# Patient Record
Sex: MALE | Race: WHITE | NOT HISPANIC OR LATINO | Employment: STUDENT | URBAN - METROPOLITAN AREA
[De-identification: names, ages, dates, MRNs, and addresses within clinical notes are randomized per-mention and may not be internally consistent; named-entity substitution may affect disease eponyms.]

---

## 2024-05-05 NOTE — PROGRESS NOTES
NEW PATIENT    HISTORY OF PRESENT ILLNESS   18 y.o. 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 He has been having right shoulder pain for about 3 weeks. He noticed it after pitching a game. He states that this past weekend he had to pull himself out of the game due to shoulder pain. He gets a shooting pain down the back of his arm. He feels a knot in the front of his shoulder. His pain is mostly at the anterior aspect of the shoulder. His shoulder pain is also aggravated by reaching and pulling. He only gets relief with rest and immobilization. He is unable to throw more than 60 feet without the shoulder hurting. He states that his  wanted him to get a steroid injection in the shoulder but he would like to discuss his options because he will be playing for [employer] next year.       Is affecting ADLs.  Pain is 4/10 at it's worst.        PAST MEDICAL HISTORY    No past medical history on file.    PAST SURGICAL HISTORY     Past Surgical History:   Procedure Laterality Date    FOOT SURGERY         FAMILY HISTORY    No family history on file.    SOCIAL HISTORY    Social History     Socioeconomic History    Marital status: Single   Tobacco Use    Smoking status: Never   Substance and Sexual Activity    Alcohol use: Never       MEDICATIONS      Current Outpatient Medications:     naproxen (NAPROSYN) 500 MG tablet, Take 1 tablet (500 mg total) by mouth 2 (two) times daily with meals., Disp: 15 tablet, Rfl: 0    ALLERGIES     Review of patient's allergies indicates:   Allergen Reactions    Peanut     Shrimp     Sulfa (sulfonamide antibiotics)          REVIEW OF SYSTEMS   Constitution: Negative. Negative for chills, fever and night sweats.   HENT: Negative for congestion and headaches.    Eyes: Negative for blurred vision, left vision loss and right vision loss.   Cardiovascular: Negative for chest pain and syncope.   Respiratory: Negative for cough and  shortness of breath.    Endocrine: Negative for polydipsia, polyphagia and polyuria.   Hematologic/Lymphatic: Negative for bleeding problem. Does not bruise/bleed easily.   Skin: Negative for dry skin, itching and rash.   Musculoskeletal: Negative for falls. Positive for right shoulder pain.  Gastrointestinal: Negative for abdominal pain and bowel incontinence.   Genitourinary: Negative for bladder incontinence and nocturia.   Neurological: Negative for disturbances in coordination, loss of balance and seizures.   Psychiatric/Behavioral: Negative for depression. The patient does not have insomnia.    Allergic/Immunologic: Negative for hives and persistent infections.     PHYSICAL EXAMINATION    Vitals: /79   Pulse (!) 57   Wt 89 kg (196 lb 3.4 oz)     General: The patient appears active and healthy with no apparent physical problems.  No disturbance of mood or affect is demonstrated. Alert and Oriented.    HEENT: Eyes normal, pupils equally round, nose normal.    Resp: Equal and symmetrical chest rises. No wheezing    CV: Regular rate    Neck: Supple; nonpainful range of motion.    Extremities: no cyanosis, clubbing, edema, or diffuse swelling.  Palpable pulses, good capillary refill of the digits.  No coolness, discoloration, edema or obvious varicosities.    Skin: no lesions noted.    Lymphatic: no detected adenopathy in the upper or lower extremities.    Neurologic: normal mental status, normal reflexes, normal gait and balance.  Patient is alert and oriented to person, place and time.  No flaccidity or spasticity is noted.  No motor or sensory deficits are noted.  Light touch is intact    Orthopaedic: SHOULDER EXAM - RIGHT    Inspection:   Normal skin color and appearance with no scars.  No muscle atrophy noted.  No scapular winging.      Palpation: No tenderness of the acromioclavicular joint, lateral edge of the acromion, biceps tendon, trapezius muscle or scapulothoracic bursa.      ROM:      PROM:      FE - 160°    Abd/ER -  90°  Abd/IR -  45°   Add/ER -  60°     AROM:    FE - 180°    Abd/ER -  90°  Abd/IR -  45°   Add/ER -  60°         Tests:     - Lopez, - Neer's, - Cross Arm Adduction, + [place], - Yerguson, - Speed. - Belly Press,  - Jobes, - Lift Off    Stability: - sulcus, + apprehension, + relocation, - load and shift, + DLS      Motor:  Rotator cuff strength is 4/5 supraspinatus, 4/5 infraspinatus, 5/5 subscapularis. Biceps, triceps and deltoid strength is 5/5.      Neuro: Distally there are no paresthesias, and sensation is intact to light touch in the median, ulnar, and radial distributions.  Reflexes are 2/2 biceps, triceps and brachioradialis.        IMAGING    I reviewed images of his right shoulder.  No fractures or dislocations noted.  Some mild posterior glenoid dysplasia noted.  No evidence of a Solitario's lesion.    IMPRESSION       ICD-10-CM ICD-9-CM   1. Right shoulder pain, unspecified chronicity  M25.511 719.41   2. Impingement syndrome of right shoulder  M75.41 726.2       MEDICATIONS PRESCRIBED      None    RECOMMENDATIONS     MRI right shoulder ordered today  Begin physical therapy   RTC after MRI      All questions were answered, pt will contact us for questions or concerns in the interim.

## 2024-05-06 ENCOUNTER — HOSPITAL ENCOUNTER (OUTPATIENT)
Dept: RADIOLOGY | Facility: HOSPITAL | Age: 19
Discharge: HOME | End: 2024-05-06
Attending: ORTHOPAEDIC SURGERY
Payer: COMMERCIAL

## 2024-05-06 ENCOUNTER — OFFICE VISIT (OUTPATIENT)
Dept: SPORTS MEDICINE | Facility: CLINIC | Age: 19
End: 2024-05-06
Payer: COMMERCIAL

## 2024-05-06 VITALS — SYSTOLIC BLOOD PRESSURE: 125 MMHG | HEART RATE: 57 BPM | DIASTOLIC BLOOD PRESSURE: 79 MMHG | WEIGHT: 196.19 LBS

## 2024-05-06 DIAGNOSIS — M75.111 NONTRAUMATIC INCOMPLETE TEAR OF RIGHT ROTATOR CUFF: ICD-10-CM

## 2024-05-06 DIAGNOSIS — M75.41 INTERNAL IMPINGEMENT OF RIGHT SHOULDER: Primary | ICD-10-CM

## 2024-05-06 DIAGNOSIS — G89.29 CHRONIC RIGHT SHOULDER PAIN: ICD-10-CM

## 2024-05-06 DIAGNOSIS — M75.41 IMPINGEMENT SYNDROME OF RIGHT SHOULDER: ICD-10-CM

## 2024-05-06 DIAGNOSIS — M25.511 RIGHT SHOULDER PAIN, UNSPECIFIED CHRONICITY: ICD-10-CM

## 2024-05-06 DIAGNOSIS — M25.511 CHRONIC RIGHT SHOULDER PAIN: ICD-10-CM

## 2024-05-06 PROCEDURE — 73030 X-RAY EXAM OF SHOULDER: CPT | Mod: TC,RT

## 2024-05-06 PROCEDURE — 99204 OFFICE O/P NEW MOD 45 MIN: CPT | Mod: S$GLB,,, | Performed by: ORTHOPAEDIC SURGERY

## 2024-05-06 PROCEDURE — 1159F MED LIST DOCD IN RCRD: CPT | Mod: CPTII,S$GLB,, | Performed by: ORTHOPAEDIC SURGERY

## 2024-05-06 PROCEDURE — 73030 X-RAY EXAM OF SHOULDER: CPT | Mod: 26,RT,, | Performed by: RADIOLOGY

## 2024-05-06 PROCEDURE — 99999 PR PBB SHADOW E&M-NEW PATIENT-LVL III: CPT | Mod: PBBFAC,,, | Performed by: ORTHOPAEDIC SURGERY

## 2024-05-06 PROCEDURE — 3078F DIAST BP <80 MM HG: CPT | Mod: CPTII,S$GLB,, | Performed by: ORTHOPAEDIC SURGERY

## 2024-05-06 PROCEDURE — 3074F SYST BP LT 130 MM HG: CPT | Mod: CPTII,S$GLB,, | Performed by: ORTHOPAEDIC SURGERY

## 2024-05-07 ENCOUNTER — HOSPITAL ENCOUNTER (OUTPATIENT)
Dept: RADIOLOGY | Facility: HOSPITAL | Age: 19
Discharge: HOME | End: 2024-05-07
Attending: ORTHOPAEDIC SURGERY
Payer: COMMERCIAL

## 2024-05-07 DIAGNOSIS — M75.41 IMPINGEMENT SYNDROME OF RIGHT SHOULDER: ICD-10-CM

## 2024-05-07 DIAGNOSIS — M25.511 RIGHT SHOULDER PAIN, UNSPECIFIED CHRONICITY: ICD-10-CM

## 2024-05-07 PROCEDURE — 73221 MRI JOINT UPR EXTREM W/O DYE: CPT | Mod: 26,RT,, | Performed by: INTERNAL MEDICINE

## 2024-05-07 PROCEDURE — 73221 MRI JOINT UPR EXTREM W/O DYE: CPT | Mod: TC,RT

## 2024-05-14 NOTE — PROGRESS NOTES
ESTABLISHED PATIENT    History 5/15/2024:  Kaushal returns to clinic today to discuss MRI results of his right shoulder.  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    History 5/6/2024:  18 y.o. 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 He has been having right shoulder pain for about 3 weeks. He noticed it after pitching a game. He states that this past weekend he had to pull himself out of the game due to shoulder pain. He gets a shooting pain down the back of his arm. He feels a knot in the front of his shoulder. His pain is mostly at the anterior aspect of the shoulder. His shoulder pain is also aggravated by reaching and pulling. He only gets relief with rest and immobilization. He is unable to throw more than 60 feet without the shoulder hurting. He states that his  wanted him to get a steroid injection in the shoulder but he would like to discuss his options because he will be playing for [employer] next year.       Is affecting ADLs.  Pain is 4/10 at it's worst.        REVIEW OF SYSTEMS   Constitution: Negative. Negative for chills, fever and night sweats.   HENT: Negative for congestion and headaches.    Eyes: Negative for blurred vision, left vision loss and right vision loss.   Cardiovascular: Negative for chest pain and syncope.   Respiratory: Negative for cough and shortness of breath.    Endocrine: Negative for polydipsia, polyphagia and polyuria.   Hematologic/Lymphatic: Negative for bleeding problem. Does not bruise/bleed easily.   Skin: Negative for dry skin, itching and rash.   Musculoskeletal: Negative for falls. Positive for right shoulder pain.  Gastrointestinal: Negative for abdominal pain and bowel incontinence.   Genitourinary: Negative for bladder incontinence and nocturia.   Neurological: Negative for disturbances in coordination, loss of balance and seizures.   Psychiatric/Behavioral: Negative for depression. The patient  does not have insomnia.    Allergic/Immunologic: Negative for hives and persistent infections.     PHYSICAL EXAMINATION    Vitals: Wt 89 kg (196 lb 3.4 oz)     General: The patient appears active and healthy with no apparent physical problems.  No disturbance of mood or affect is demonstrated. Alert and Oriented.    HEENT: Eyes normal, pupils equally round, nose normal.    Resp: Equal and symmetrical chest rises. No wheezing    CV: Regular rate    Neck: Supple; nonpainful range of motion.    Extremities: no cyanosis, clubbing, edema, or diffuse swelling.  Palpable pulses, good capillary refill of the digits.  No coolness, discoloration, edema or obvious varicosities.    Skin: no lesions noted.    Lymphatic: no detected adenopathy in the upper or lower extremities.    Neurologic: normal mental status, normal reflexes, normal gait and balance.  Patient is alert and oriented to person, place and time.  No flaccidity or spasticity is noted.  No motor or sensory deficits are noted.  Light touch is intact    Orthopaedic: SHOULDER EXAM - RIGHT    Inspection:   Normal skin color and appearance with no scars.  No muscle atrophy noted.  No scapular winging.      Palpation: No tenderness of the acromioclavicular joint, lateral edge of the acromion, biceps tendon, trapezius muscle or scapulothoracic bursa.      ROM:      PROM:     FE - 160°    Abd/ER -  90°  Abd/IR -  45°   Add/ER -  60°     AROM:    FE - 180°    Abd/ER -  90°  Abd/IR -  45°   Add/ER -  60°         Tests:     - Lopez, - Neer's, - Cross Arm Adduction, + Deal, - Yerguson, - Speed. - Belly Press,  - Jobes, - Lift Off    Stability: - sulcus, + apprehension, + relocation, - load and shift, + DLS      Motor:  Rotator cuff strength is 4/5 supraspinatus, 4/5 infraspinatus, 5/5 subscapularis. Biceps, triceps and deltoid strength is 5/5.      Neuro: Distally there are no paresthesias, and sensation is intact to light touch in the median, ulnar, and radial  distributions.  Reflexes are 2/2 biceps, triceps and brachioradialis.        IMAGING    Narrative & Impression  EXAMINATION:  MRI SHOULDER WITHOUT CONTRAST RIGHT     CLINICAL HISTORY:  Shoulder pain, labral tear suspected, xray done;  Pain in right shoulder     TECHNIQUE:  Multiplanar multisequence MRI examination of right shoulder.     COMPARISON:  Right shoulder radiograph 05/06/2024.     FINDINGS:  Sequences are degraded by patient motion artifact.     Coracoacromial arch: The acromioclavicular joint is unremarkable.  Trace subacromial subdeltoid bursal fluid.     Rotator cuff: Low grade partial thickness intrasubstance tear of the infraspinatus posteriorly.  Supraspinatus, subscapularis, and teres minor intact. No retraction or muscle atrophy.     Glenoid labrum: Nondisplaced posterior to inferior labral tear.     Biceps tendon: The biceps tendon long head is intact at the superior glenoid tubercle and is normally positioned in the biceps groove.     Bone: Blunting of the posterior glenoid rim with glenoid retroversion.  No acute fracture, osteonecrosis, or focal lesion.     Joint: No cartilage defect. No joint effusion, synovitis, or capsulitis.     Impression:     Motion degraded examination.     Low-grade intrasubstance tear of infraspinatus.     Nondisplaced labral tear extending from posterosuperior to inferior.     Blunting of the posterior glenoid rim with retroversion, which could be physiologic or traumatic in a throwing athlete.     Electronically signed by resident: Daria Conte  Date:                                            05/07/2024  Time:                                           15:37     Electronically signed by:Anupam Whaley  Date:                                            05/07/2024  Time:                                           17:29    IMPRESSION       ICD-10-CM ICD-9-CM   1. Internal impingement of right shoulder  M75.41 726.2   2. Nontraumatic incomplete tear of right rotator cuff   M75.111 726.13         MEDICATIONS PRESCRIBED      None    RECOMMENDATIONS     We discussed management options for his right shoulder.  I encouraged him to continue with physical therapy focusing on scapular stabilization, core strengthening and leg strengthening.  I advised him against surgery at this point.  I recommended following up with me once he completes physical therapy and a throwing program.  If his symptoms persist we did recommend possible intra-articular corticosteroid injection.  He does understand that surgery may be an option in the future however based on his wishes and current situation, we will try to continue nonsurgical management.      All questions were answered, pt will contact us for questions or concerns in the interim.

## 2024-05-15 ENCOUNTER — OFFICE VISIT (OUTPATIENT)
Dept: SPORTS MEDICINE | Facility: CLINIC | Age: 19
End: 2024-05-15
Payer: COMMERCIAL

## 2024-05-15 VITALS — WEIGHT: 196.19 LBS

## 2024-05-15 DIAGNOSIS — M75.111 NONTRAUMATIC INCOMPLETE TEAR OF RIGHT ROTATOR CUFF: ICD-10-CM

## 2024-05-15 DIAGNOSIS — M75.41 INTERNAL IMPINGEMENT OF RIGHT SHOULDER: Primary | ICD-10-CM

## 2024-05-15 PROCEDURE — 99999 PR PBB SHADOW E&M-EST. PATIENT-LVL II: CPT | Mod: PBBFAC,,, | Performed by: ORTHOPAEDIC SURGERY

## 2024-05-15 PROCEDURE — 1159F MED LIST DOCD IN RCRD: CPT | Mod: CPTII,S$GLB,, | Performed by: ORTHOPAEDIC SURGERY

## 2024-05-15 PROCEDURE — 99214 OFFICE O/P EST MOD 30 MIN: CPT | Mod: S$GLB,,, | Performed by: ORTHOPAEDIC SURGERY
